# Patient Record
Sex: MALE | HISPANIC OR LATINO | URBAN - METROPOLITAN AREA
[De-identification: names, ages, dates, MRNs, and addresses within clinical notes are randomized per-mention and may not be internally consistent; named-entity substitution may affect disease eponyms.]

---

## 2017-01-17 ENCOUNTER — APPOINTMENT (RX ONLY)
Dept: URBAN - METROPOLITAN AREA CLINIC 29 | Facility: CLINIC | Age: 22
Setting detail: DERMATOLOGY
End: 2017-01-17

## 2017-01-17 DIAGNOSIS — L70.0 ACNE VULGARIS: ICD-10-CM

## 2017-01-17 DIAGNOSIS — Z79.899 OTHER LONG TERM (CURRENT) DRUG THERAPY: ICD-10-CM

## 2017-01-17 PROCEDURE — 99214 OFFICE O/P EST MOD 30 MIN: CPT

## 2017-01-17 PROCEDURE — ? COUNSELING

## 2017-01-17 PROCEDURE — ? ORDER TESTS

## 2017-01-17 PROCEDURE — ? PRESCRIPTION

## 2017-01-17 PROCEDURE — ? HIGH RISK MEDICATION MONITORING

## 2017-01-17 RX ORDER — ISOTRETINOIN 40 MG/1
1 CAPSULE, LIQUID FILLED ORAL QD
Qty: 30 | Refills: 0 | Status: ERX

## 2017-01-17 ASSESSMENT — LOCATION DETAILED DESCRIPTION DERM
LOCATION DETAILED: RIGHT INFERIOR CENTRAL MALAR CHEEK
LOCATION DETAILED: LEFT CENTRAL MALAR CHEEK

## 2017-01-17 ASSESSMENT — LOCATION SIMPLE DESCRIPTION DERM
LOCATION SIMPLE: LEFT CHEEK
LOCATION SIMPLE: RIGHT CHEEK

## 2017-01-17 ASSESSMENT — LOCATION ZONE DERM: LOCATION ZONE: FACE

## 2017-02-17 ENCOUNTER — APPOINTMENT (RX ONLY)
Dept: URBAN - METROPOLITAN AREA CLINIC 29 | Facility: CLINIC | Age: 22
Setting detail: DERMATOLOGY
End: 2017-02-17

## 2017-02-17 DIAGNOSIS — L70.0 ACNE VULGARIS: ICD-10-CM

## 2017-02-17 DIAGNOSIS — Z79.899 OTHER LONG TERM (CURRENT) DRUG THERAPY: ICD-10-CM

## 2017-02-17 PROCEDURE — ? ORDER TESTS

## 2017-02-17 PROCEDURE — ? ISOTRETINOIN MONITORING

## 2017-02-17 PROCEDURE — ? HIGH RISK MEDICATION MONITORING

## 2017-02-17 PROCEDURE — ? PRESCRIPTION

## 2017-02-17 PROCEDURE — ? COUNSELING

## 2017-02-17 PROCEDURE — ? TREATMENT REGIMEN

## 2017-02-17 PROCEDURE — 99214 OFFICE O/P EST MOD 30 MIN: CPT

## 2017-02-17 RX ORDER — ISOTRETINOIN 40 MG/1
1 CAPSULE, LIQUID FILLED ORAL ONCE DAILY
Qty: 30 | Refills: 0 | Status: ERX

## 2017-02-17 ASSESSMENT — LOCATION SIMPLE DESCRIPTION DERM
LOCATION SIMPLE: RIGHT CHEEK
LOCATION SIMPLE: LEFT CHEEK

## 2017-02-17 ASSESSMENT — LOCATION DETAILED DESCRIPTION DERM
LOCATION DETAILED: LEFT CENTRAL MALAR CHEEK
LOCATION DETAILED: RIGHT INFERIOR CENTRAL MALAR CHEEK

## 2017-02-17 ASSESSMENT — LOCATION ZONE DERM: LOCATION ZONE: FACE

## 2017-02-17 NOTE — PROCEDURE: ISOTRETINOIN MONITORING
Dosing Month 1 (Required For Cumulative Dosing): 20mg Daily
Dosing Month 4 (Required For Cumulative Dosing): 40mg Daily
Ipledge Number (Optional): [id number]
Patient Weight (Optional But Required For Cumulative Dose-Numbers And Decimals Only): 180
Kilograms Preamble Statement (Weight Entered In Details Tab): Reported Weight in pounds:
Display Individual Monthly Dosage In The Note (If Yes Will Display All Dosages Which Are Not N/A): yes
Weight Units: pounds
Dosing Month 2 (Required For Cumulative Dosing): 30mg Daily
Months Of Therapy Completed: 4
Detail Level: Zone

## 2017-02-17 NOTE — HPI: MEDICATION (ISOTRETINOIN)
How Severe Is It?: mild
Is This A New Presentation, Or A Follow-Up?: Follow Up Isotretinoin
Display Cumulative Dose In The Note?: Yes
Patient Reported Weight In Pounds (Required For Calculation): 180
When Was Isotretinoin Started?: 10/2016

## 2017-03-24 ENCOUNTER — APPOINTMENT (RX ONLY)
Dept: URBAN - METROPOLITAN AREA CLINIC 29 | Facility: CLINIC | Age: 22
Setting detail: DERMATOLOGY
End: 2017-03-24

## 2017-03-24 DIAGNOSIS — Z79.899 OTHER LONG TERM (CURRENT) DRUG THERAPY: ICD-10-CM

## 2017-03-24 DIAGNOSIS — L90.5 SCAR CONDITIONS AND FIBROSIS OF SKIN: ICD-10-CM

## 2017-03-24 DIAGNOSIS — L70.0 ACNE VULGARIS: ICD-10-CM

## 2017-03-24 DIAGNOSIS — L81.0 POSTINFLAMMATORY HYPERPIGMENTATION: ICD-10-CM

## 2017-03-24 DIAGNOSIS — Z71.89 OTHER SPECIFIED COUNSELING: ICD-10-CM

## 2017-03-24 PROCEDURE — ? HIGH RISK MEDICATION MONITORING

## 2017-03-24 PROCEDURE — 99214 OFFICE O/P EST MOD 30 MIN: CPT

## 2017-03-24 PROCEDURE — ? ORDER TESTS

## 2017-03-24 PROCEDURE — ? COUNSELING

## 2017-03-24 PROCEDURE — ? PRESCRIPTION

## 2017-03-24 RX ORDER — ISOTRETINOIN 40 MG/1
1 CAPSULE, LIQUID FILLED ORAL QD
Qty: 30 | Refills: 0 | Status: ERX

## 2017-03-24 ASSESSMENT — LOCATION SIMPLE DESCRIPTION DERM
LOCATION SIMPLE: LEFT CHEEK
LOCATION SIMPLE: RIGHT CHEEK

## 2017-03-24 ASSESSMENT — LOCATION DETAILED DESCRIPTION DERM
LOCATION DETAILED: RIGHT CENTRAL MALAR CHEEK
LOCATION DETAILED: LEFT INFERIOR CENTRAL MALAR CHEEK
LOCATION DETAILED: RIGHT INFERIOR CENTRAL MALAR CHEEK
LOCATION DETAILED: LEFT SUPERIOR MEDIAL BUCCAL CHEEK

## 2017-03-24 ASSESSMENT — LOCATION ZONE DERM: LOCATION ZONE: FACE

## 2017-03-24 NOTE — HPI: MEDICATION (ISOTRETINOIN)
How Severe Is It?: moderate
Is This A New Presentation, Or A Follow-Up?: Follow Up Isotretinoin
When Was Your Last Visit?: 02/17/17

## 2017-04-28 ENCOUNTER — APPOINTMENT (RX ONLY)
Dept: URBAN - METROPOLITAN AREA CLINIC 29 | Facility: CLINIC | Age: 22
Setting detail: DERMATOLOGY
End: 2017-04-28

## 2017-04-28 DIAGNOSIS — L81.0 POSTINFLAMMATORY HYPERPIGMENTATION: ICD-10-CM

## 2017-04-28 DIAGNOSIS — L90.5 SCAR CONDITIONS AND FIBROSIS OF SKIN: ICD-10-CM

## 2017-04-28 DIAGNOSIS — L70.0 ACNE VULGARIS: ICD-10-CM

## 2017-04-28 DIAGNOSIS — Z71.89 OTHER SPECIFIED COUNSELING: ICD-10-CM

## 2017-04-28 DIAGNOSIS — Z79.899 OTHER LONG TERM (CURRENT) DRUG THERAPY: ICD-10-CM

## 2017-04-28 PROCEDURE — ? ORDER TESTS

## 2017-04-28 PROCEDURE — ? SUNSCREEN RECOMMENDATIONS

## 2017-04-28 PROCEDURE — ? HIGH RISK MEDICATION MONITORING

## 2017-04-28 PROCEDURE — ? COUNSELING

## 2017-04-28 PROCEDURE — ? ISOTRETINOIN MONITORING

## 2017-04-28 PROCEDURE — ? TREATMENT REGIMEN

## 2017-04-28 PROCEDURE — ? PRESCRIPTION

## 2017-04-28 PROCEDURE — 99214 OFFICE O/P EST MOD 30 MIN: CPT

## 2017-04-28 RX ORDER — TRETINOIN 0.25 MG/G
1 CREAM TOPICAL QHS
Qty: 1 | Refills: 3 | Status: ERX | COMMUNITY
Start: 2017-04-28

## 2017-04-28 RX ADMIN — TRETINOIN 1: 0.25 CREAM TOPICAL at 15:57

## 2017-04-28 ASSESSMENT — LOCATION SIMPLE DESCRIPTION DERM
LOCATION SIMPLE: RIGHT CHEEK
LOCATION SIMPLE: LEFT CHEEK
LOCATION SIMPLE: RIGHT UPPER BACK

## 2017-04-28 ASSESSMENT — LOCATION DETAILED DESCRIPTION DERM
LOCATION DETAILED: RIGHT INFERIOR CENTRAL MALAR CHEEK
LOCATION DETAILED: LEFT SUPERIOR MEDIAL BUCCAL CHEEK
LOCATION DETAILED: RIGHT CENTRAL MALAR CHEEK
LOCATION DETAILED: LEFT INFERIOR CENTRAL MALAR CHEEK
LOCATION DETAILED: RIGHT SUPERIOR MEDIAL UPPER BACK

## 2017-04-28 ASSESSMENT — LOCATION ZONE DERM
LOCATION ZONE: FACE
LOCATION ZONE: TRUNK

## 2017-04-28 NOTE — PROCEDURE: TREATMENT REGIMEN
Modify Regimen: Start Tretinoin 0.025%- QHS in about 2 weeks
Otc Regimen: Cetaphil and CeraVe cleanser, cream, and sunscreen
Discontinue Regimen: Claravis after last pill
Samples Given: Cetaphil and CeraVe
Detail Level: Zone

## 2017-04-28 NOTE — PROCEDURE: ISOTRETINOIN MONITORING
Ipledge Number (Optional): [id number]
Dosing Month 3 (Required For Cumulative Dosing): 40mg Daily
Months Of Therapy Completed: 6
Kilograms Preamble Statement (Weight Entered In Details Tab): Reported Weight in pounds:
Display Individual Monthly Dosage In The Note (If Yes Will Display All Dosages Which Are Not N/A): yes
Weight Units: pounds
Detail Level: Zone
Dosing Month 2 (Required For Cumulative Dosing): 30mg Daily
Dosing Month 1 (Required For Cumulative Dosing): 20mg Daily
Patient Weight (Optional But Required For Cumulative Dose-Numbers And Decimals Only): 180

## 2017-07-28 ENCOUNTER — APPOINTMENT (RX ONLY)
Dept: URBAN - METROPOLITAN AREA CLINIC 29 | Facility: CLINIC | Age: 22
Setting detail: DERMATOLOGY
End: 2017-07-28

## 2017-07-28 DIAGNOSIS — L70.0 ACNE VULGARIS: ICD-10-CM | Status: WELL CONTROLLED

## 2017-07-28 DIAGNOSIS — Z71.89 OTHER SPECIFIED COUNSELING: ICD-10-CM

## 2017-07-28 DIAGNOSIS — L81.0 POSTINFLAMMATORY HYPERPIGMENTATION: ICD-10-CM

## 2017-07-28 PROCEDURE — ? COUNSELING

## 2017-07-28 PROCEDURE — ? TREATMENT REGIMEN

## 2017-07-28 PROCEDURE — ? OTHER

## 2017-07-28 PROCEDURE — 99213 OFFICE O/P EST LOW 20 MIN: CPT

## 2017-07-28 ASSESSMENT — LOCATION ZONE DERM: LOCATION ZONE: FACE

## 2017-07-28 ASSESSMENT — LOCATION DETAILED DESCRIPTION DERM
LOCATION DETAILED: RIGHT INFERIOR CENTRAL MALAR CHEEK
LOCATION DETAILED: RIGHT INFERIOR LATERAL MALAR CHEEK
LOCATION DETAILED: LEFT INFERIOR CENTRAL MALAR CHEEK

## 2017-07-28 ASSESSMENT — LOCATION SIMPLE DESCRIPTION DERM
LOCATION SIMPLE: LEFT CHEEK
LOCATION SIMPLE: RIGHT CHEEK

## 2017-07-28 NOTE — PROCEDURE: OTHER
Note Text (......Xxx Chief Complaint.): This diagnosis correlates with the
Detail Level: Zone
Other (Free Text): Patient is going to get labs done today for post Accutane visit